# Patient Record
Sex: FEMALE | Race: WHITE | NOT HISPANIC OR LATINO | Employment: UNEMPLOYED | URBAN - METROPOLITAN AREA
[De-identification: names, ages, dates, MRNs, and addresses within clinical notes are randomized per-mention and may not be internally consistent; named-entity substitution may affect disease eponyms.]

---

## 2018-04-25 ENCOUNTER — CONVERSION ENCOUNTER (OUTPATIENT)
Dept: URGENT CARE | Facility: CLINIC | Age: 53
End: 2018-04-25

## 2018-04-25 ENCOUNTER — HOSPITAL ENCOUNTER (OUTPATIENT)
Dept: URGENT CARE | Facility: CLINIC | Age: 53
Discharge: HOME | End: 2018-04-25
Attending: FAMILY MEDICINE | Admitting: FAMILY MEDICINE

## 2018-06-20 ENCOUNTER — HOSPITAL ENCOUNTER (OUTPATIENT)
Dept: GENERAL RADIOLOGY | Facility: HOSPITAL | Age: 53
Discharge: HOME | End: 2018-06-20
Attending: NURSE PRACTITIONER | Admitting: NURSE PRACTITIONER

## 2018-06-21 ENCOUNTER — HOSPITAL ENCOUNTER (OUTPATIENT)
Dept: CARDIOLOGY | Facility: HOSPITAL | Age: 53
Discharge: HOME | End: 2018-06-21
Attending: NURSE PRACTITIONER | Admitting: NURSE PRACTITIONER

## 2019-06-04 VITALS
OXYGEN SATURATION: 98 % | HEART RATE: 75 BPM | HEIGHT: 63 IN | RESPIRATION RATE: 20 BRPM | DIASTOLIC BLOOD PRESSURE: 78 MMHG | SYSTOLIC BLOOD PRESSURE: 129 MMHG | BODY MASS INDEX: 28.49 KG/M2 | WEIGHT: 160.8 LBS

## 2019-09-26 ENCOUNTER — HOSPITAL ENCOUNTER (EMERGENCY)
Facility: HOSPITAL | Age: 54
Discharge: LEFT BEFORE DISCHARGE | End: 2019-09-26

## 2019-09-26 VITALS
OXYGEN SATURATION: 97 % | HEIGHT: 63 IN | BODY MASS INDEX: 28.98 KG/M2 | HEART RATE: 82 BPM | RESPIRATION RATE: 16 BRPM | WEIGHT: 163.58 LBS | TEMPERATURE: 97.9 F | SYSTOLIC BLOOD PRESSURE: 150 MMHG | DIASTOLIC BLOOD PRESSURE: 82 MMHG

## 2022-11-18 ENCOUNTER — TRANSCRIBE ORDERS (OUTPATIENT)
Dept: ADMINISTRATIVE | Facility: HOSPITAL | Age: 57
End: 2022-11-18

## 2022-11-18 DIAGNOSIS — I82.413 DVT OF DEEP FEMORAL VEIN, BILATERAL: Primary | ICD-10-CM

## 2023-01-29 ENCOUNTER — HOSPITAL ENCOUNTER (OUTPATIENT)
Facility: HOSPITAL | Age: 58
Discharge: HOME | End: 2023-01-29
Attending: EMERGENCY MEDICINE | Admitting: EMERGENCY MEDICINE
Payer: MEDICARE

## 2023-01-29 VITALS
OXYGEN SATURATION: 95 % | HEART RATE: 105 BPM | WEIGHT: 170 LBS | HEIGHT: 63 IN | TEMPERATURE: 98.9 F | BODY MASS INDEX: 30.12 KG/M2 | RESPIRATION RATE: 16 BRPM | DIASTOLIC BLOOD PRESSURE: 68 MMHG | SYSTOLIC BLOOD PRESSURE: 157 MMHG

## 2023-01-29 DIAGNOSIS — H66.002 NON-RECURRENT ACUTE SUPPURATIVE OTITIS MEDIA OF LEFT EAR WITHOUT SPONTANEOUS RUPTURE OF TYMPANIC MEMBRANE: Primary | ICD-10-CM

## 2023-01-29 PROCEDURE — G0463 HOSPITAL OUTPT CLINIC VISIT: HCPCS | Performed by: EMERGENCY MEDICINE

## 2023-01-29 PROCEDURE — 99213 OFFICE O/P EST LOW 20 MIN: CPT | Performed by: EMERGENCY MEDICINE

## 2023-01-29 RX ORDER — AZITHROMYCIN 250 MG/1
500 TABLET, FILM COATED ORAL ONCE
Status: COMPLETED | OUTPATIENT
Start: 2023-01-29 | End: 2023-01-29

## 2023-01-29 RX ORDER — CETIRIZINE HYDROCHLORIDE 10 MG/1
10 TABLET ORAL DAILY
Qty: 14 TABLET | Refills: 0 | Status: SHIPPED | OUTPATIENT
Start: 2023-01-29

## 2023-01-29 RX ORDER — AZITHROMYCIN 250 MG/1
TABLET, FILM COATED ORAL
Qty: 6 TABLET | Refills: 0 | Status: SHIPPED | OUTPATIENT
Start: 2023-01-29

## 2023-01-29 RX ADMIN — AZITHROMYCIN MONOHYDRATE 500 MG: 250 TABLET ORAL at 19:45

## 2023-01-30 NOTE — FSED PROVIDER NOTE
Subjective   History of Present Illness  Patient 57-year-old woman who presents complaining of nasal rhinorrhea with congestion and bilateral ear pain with left greater than right.  Symptoms all began approximately 6 days ago.  No associated fevers.  Patient also denies any throat pain.  Patient does have occasional cough but no shortness of breath or chest pain.  No vomiting or diarrhea no difficulty voiding.        Review of Systems   Constitutional: Negative for chills, fatigue and fever.   HENT: Positive for ear pain, rhinorrhea and sinus pressure. Negative for ear discharge, sore throat and trouble swallowing.    Respiratory: Positive for cough. Negative for shortness of breath.    Gastrointestinal: Negative for abdominal pain, diarrhea and vomiting.   Genitourinary: Negative for difficulty urinating.   Musculoskeletal: Positive for myalgias. Negative for back pain.   Neurological: Positive for headaches (Sinus congestion).       Past Medical History:   Diagnosis Date   • Asthma        Allergies   Allergen Reactions   • Influenza Virus Vaccine Swelling   • Latex Anaphylaxis   • Levaquin [Levofloxacin] Arrhythmia   • Paroxetine Itching   • Paxil [Paroxetine Hcl] Anaphylaxis   • Propoxyphene Hives       Past Surgical History:   Procedure Laterality Date   • CHOLECYSTECTOMY     • ECTOPIC PREGNANCY SURGERY     • TUBAL ABDOMINAL LIGATION         History reviewed. No pertinent family history.    Social History     Socioeconomic History   • Marital status:    Tobacco Use   • Smoking status: Every Day     Packs/day: 0.50     Types: Cigarettes   • Smokeless tobacco: Never   Vaping Use   • Vaping Use: Never used           Objective   Physical Exam  Vitals and nursing note reviewed.   Constitutional:       General: She is not in acute distress.     Appearance: Normal appearance. She is not ill-appearing or toxic-appearing.   HENT:      Head: Normocephalic and atraumatic.      Right Ear: Tympanic membrane, ear  canal and external ear normal.      Left Ear: Ear canal and external ear normal.      Ears:      Comments: Left ear examination with erythematous tympanic membrane.  No tragal tenderness.     Nose: Rhinorrhea present.      Mouth/Throat:      Mouth: Mucous membranes are moist.      Pharynx: Oropharynx is clear. No posterior oropharyngeal erythema.   Eyes:      Extraocular Movements: Extraocular movements intact.      Conjunctiva/sclera: Conjunctivae normal.      Pupils: Pupils are equal, round, and reactive to light.   Cardiovascular:      Rate and Rhythm: Normal rate and regular rhythm.      Pulses: Normal pulses.      Heart sounds: Normal heart sounds.   Pulmonary:      Effort: Pulmonary effort is normal.      Breath sounds: Normal breath sounds.   Abdominal:      General: Bowel sounds are normal.      Palpations: Abdomen is soft.      Tenderness: There is no abdominal tenderness.   Musculoskeletal:         General: No tenderness. Normal range of motion.      Cervical back: Normal range of motion and neck supple.      Right lower leg: No edema.      Left lower leg: No edema.   Skin:     General: Skin is warm and dry.      Capillary Refill: Capillary refill takes less than 2 seconds.   Neurological:      General: No focal deficit present.      Mental Status: She is alert.      Sensory: No sensory deficit.      Motor: No weakness.         Procedures           ED Course                                           MDM  Patient is a 57-year-old woman who presents complaining of nasal rhinorrhea and sinus congestion with bilateral ear pain with left greater than the right.  Symptoms have been ongoing for approximately 6 days.  No fevers or shortness of breath or chest pain or abdominal pain or vomiting or diarrhea or dysuria.  Patient states that she has had similar symptoms in the past and was treated with azithromycin which helped.  Patient also concerned that she may be having allergies as her  brought home 3  cats recently.  Suspect the patient may be having nasal rhinorrhea which has led to bilateral ear effusion with otitis media to the left ear.  Patient will be placed on azithromycin and Zyrtec.  Patient will return if symptoms worsen or any concerns.    Final diagnoses:   Non-recurrent acute suppurative otitis media of left ear without spontaneous rupture of tympanic membrane       ED Disposition  ED Disposition     ED Disposition   Discharge    Condition   Stable    Comment   --             Abelardo Felipe MD  [address] [place] [address] [place] [address]  [phone]    In 1 week      [facility]  [address] [facility] [address] [phone]    If symptoms worsen         Medication List      New Prescriptions    azithromycin 250 MG tablet  Commonly known as: ZITHROMAX  Take 2 tabs on day one, then 1 tablet once a day on days 2-5.     cetirizine 10 MG tablet  Commonly known as: zyrTEC  Take 1 tablet by mouth Daily.           Where to Get Your Medications      These medications were sent to [facility]/pharmacy #[id number] - [place], [place] - [address] [place] [address] - [phone]  - [phone] [address] [place] [address]    Hours: 24-hours Phone: [phone]   · azithromycin 250 MG tablet  · cetirizine 10 MG tablet

## 2023-01-30 NOTE — DISCHARGE INSTRUCTIONS
Please take Azithromycin as prescribed, starting tomorrow since you received your first dose tonight. Seek immediate medical attention if having any concerns.

## 2024-07-16 ENCOUNTER — HOSPITAL ENCOUNTER (EMERGENCY)
Facility: HOSPITAL | Age: 59
Discharge: HOME | End: 2024-07-16
Attending: EMERGENCY MEDICINE
Payer: MEDICARE

## 2024-07-16 VITALS
TEMPERATURE: 98.3 F | HEIGHT: 63 IN | BODY MASS INDEX: 29.95 KG/M2 | RESPIRATION RATE: 18 BRPM | SYSTOLIC BLOOD PRESSURE: 135 MMHG | WEIGHT: 169 LBS | OXYGEN SATURATION: 96 % | HEART RATE: 65 BPM | DIASTOLIC BLOOD PRESSURE: 76 MMHG

## 2024-07-16 DIAGNOSIS — K02.9 DENTAL CARIES: ICD-10-CM

## 2024-07-16 DIAGNOSIS — K08.89 PAIN, DENTAL: Primary | ICD-10-CM

## 2024-07-16 PROCEDURE — 99283 EMERGENCY DEPT VISIT LOW MDM: CPT | Performed by: NURSE PRACTITIONER

## 2024-07-16 PROCEDURE — 99283 EMERGENCY DEPT VISIT LOW MDM: CPT

## 2024-07-16 RX ORDER — AMOXICILLIN 500 MG/1
500 CAPSULE ORAL 3 TIMES DAILY
Qty: 30 CAPSULE | Refills: 0 | Status: SHIPPED | OUTPATIENT
Start: 2024-07-16 | End: 2024-07-26

## 2024-07-16 RX ORDER — CHLORHEXIDINE GLUCONATE ORAL RINSE 1.2 MG/ML
15 SOLUTION DENTAL 2 TIMES DAILY
Qty: 900 ML | Refills: 0 | Status: SHIPPED | OUTPATIENT
Start: 2024-07-16 | End: 2024-08-15

## 2024-07-16 RX ADMIN — DIPHENHYDRAMINE HCL ORAL: 25 SOLUTION ORAL at 22:21

## 2024-07-17 NOTE — FSED PROVIDER NOTE
Subjective   History of Present Illness  The patient is a 58-year-old female who presents to the ER with dental pain.  Patient reports that she has multiple teeth that need to be pulled.  Patient is complaining of right frontal lower dental pain for the past 2 days.  Patient has multiple areas of dental decay.  Patient reports she has called multiple dental offices today without success.     History provided by:  Patient   used: No        Review of Systems   HENT:  Positive for dental problem.        Past Medical History:   Diagnosis Date    Asthma        Allergies   Allergen Reactions    Influenza Virus Vaccine Swelling    Latex Anaphylaxis    Levaquin [Levofloxacin] Arrhythmia    Paroxetine Itching    Paxil [Paroxetine Hcl] Anaphylaxis    Propoxyphene Hives       Past Surgical History:   Procedure Laterality Date    CHOLECYSTECTOMY      ECTOPIC PREGNANCY SURGERY      TUBAL ABDOMINAL LIGATION         History reviewed. No pertinent family history.    Social History     Socioeconomic History    Marital status:    Tobacco Use    Smoking status: Every Day     Current packs/day: 0.50     Types: Cigarettes    Smokeless tobacco: Never   Vaping Use    Vaping status: Never Used           Objective   Physical Exam  Vitals and nursing note reviewed.   Constitutional:       Appearance: Normal appearance.   HENT:      Head: Normocephalic.      Mouth/Throat:      Dentition: Dental tenderness and dental caries present. No dental abscesses.      Comments: Patient with no trismus, no oral airway edema, no difficulty swallowing, no eccymosis gingival line, no facial swelling, No palpable asbcess noted,     Patient with multiple teeth that has dental caries. Has several teeth that is broken off. Does have some redness noted to the lower front teeth.   Musculoskeletal:         General: Normal range of motion.   Skin:     General: Skin is warm and dry.   Neurological:      General: No focal deficit present.       Mental Status: She is alert and oriented to person, place, and time.   Psychiatric:         Mood and Affect: Mood normal.         Behavior: Behavior normal. Behavior is cooperative.         Procedures           ED Course                                           Medical Decision Making  The patient is a 58-year-old female who presents to the ER with dental pain.  Patient reports that she has multiple teeth that need to be pulled.  Patient is complaining of right frontal lower dental pain for the past 2 days.  Patient has multiple areas of dental decay.  Patient reports she has called multiple dental offices today without success.     Patient with no trismus, no oral airway edema, no difficulty swallowing, no eccymosis gingival line, no facial swelling, No palpable asbcess noted,   Patient with multiple teeth that has dental caries. Has several teeth that is broken off. Does have some redness noted to the lower front teeth.     Patient presents for dental pain due to suspected dental felicita. Patient not immunosuppressed, afebrile and well appearing with patent airway, have low suspicfion for deep space infection or any concern for airway compromise. Based on history, physical, and work up. No evidence of avulsion, or bleeding socket. No evidence of RPA, PTA, Vinny's angina, periapical abscess.Instructed patient to continue to treat pain with ibuprofen/acetaminophen until they see a dentist.  Patient discharged home and will follow up with dentist. Discussed return precautions for odontogenic infections and other dental pain emergencies. Will provide dental clinic list.             Risk  Prescription drug management.        Final diagnoses:   Pain, dental   Dental caries       ED Disposition  ED Disposition       ED Disposition   Discharge    Condition   Stable    Comment   --               Abelardo Felipe MD  [address] [place] [address]  [phone]    Schedule an appointment as soon as possible  for a visit in 1 week  As needed, If symptoms worsen         Medication List        New Prescriptions      amoxicillin 500 MG capsule  Commonly known as: AMOXIL  Take 1 capsule by mouth 3 (Three) Times a Day for 10 days.     chlorhexidine 0.12 % solution  Commonly known as: PERIDEX  Apply 15 mL to the mouth or throat 2 (Two) Times a Day for 30 days. Swish around in your mouth and spit out.               Where to Get Your Medications        These medications were sent to [facility]/pharmacy #[id number] - [place], [place] - [address] [place] [address] [phone]  - [phone] [address] [place] [address]      Hours: 24-hours Phone: [phone]   amoxicillin 500 MG capsule  chlorhexidine 0.12 % solution

## 2024-07-17 NOTE — DISCHARGE INSTRUCTIONS
[facility] List    [facility]  [address] [facility]  [phone] Based on Income (Monday-Friday)  Appointment ONLY 8am-1pm  $15 minimum   [facility]  [address]  [phone] Based on Income (Monday-Friday)  [facility]  $12-24 minimum   [facility]  [address] [facility]   [phone] By appointment ONLY  Must call by 8:30 am to obtain an appointment for the next day  $50 minimum   [facility]  [address] [facility]  [phone] Must be homeless to be seen   [facility]  [address] [facility]  [phone] Walk in and appointments  7 days a week 9am-9pm  $83 minimum   [facility]  [address]  [phone] Walk in and appointments  9am-4pm  $50 minimum  Passport and other insurances accepted   [facility]  [address] [facility]  [phone] Walk in and appointments  9am-4pm  $50 minimum  Passport and other insurances accepted   [address] [place] [address] Dental  [address]  [phone] Walk in and appointments  9am-4pm  $50 minimum  Passport and other insurances accepted   [facility]  [phone] Walk in and appointments  9am-4pm  $50 minimum  Passport and other insurances accepted   [address]  [phone] Walk in and appointments  9am-4pm  $50 minimum  Passport and other insurances accepted       List listing is provided only as an informal guide and is not guaranteed to be complete or correct.  Please may your own inquiries and confirm the terms of care prior to setting an appointment.      Antinbiotics as prescribed, mouth rinse as prescribed,   Dental balls as needed for pain.     Call for follow up appointment.     Return for new or worsening problems

## 2024-08-05 ENCOUNTER — HOSPITAL ENCOUNTER (OUTPATIENT)
Facility: HOSPITAL | Age: 59
Discharge: HOME | End: 2024-08-05
Attending: EMERGENCY MEDICINE | Admitting: EMERGENCY MEDICINE
Payer: MEDICARE

## 2024-08-05 VITALS
TEMPERATURE: 98.2 F | SYSTOLIC BLOOD PRESSURE: 132 MMHG | WEIGHT: 169.09 LBS | OXYGEN SATURATION: 96 % | HEIGHT: 63 IN | BODY MASS INDEX: 29.96 KG/M2 | RESPIRATION RATE: 16 BRPM | HEART RATE: 96 BPM | DIASTOLIC BLOOD PRESSURE: 79 MMHG

## 2024-08-05 DIAGNOSIS — L30.4 INTERTRIGO: Primary | ICD-10-CM

## 2024-08-05 PROCEDURE — 99212 OFFICE O/P EST SF 10 MIN: CPT

## 2024-08-05 PROCEDURE — G0463 HOSPITAL OUTPT CLINIC VISIT: HCPCS

## 2024-08-05 RX ORDER — NYSTATIN 100000 [USP'U]/G
POWDER TOPICAL 3 TIMES DAILY
Qty: 15 G | Refills: 0 | Status: SHIPPED | OUTPATIENT
Start: 2024-08-05 | End: 2024-08-12

## 2024-08-06 NOTE — DISCHARGE INSTRUCTIONS
Take Tylenol or ibuprofen as needed for pain    Recommend that you apply nystatin powder to the right lower skin fold skin rash.    Follow-up with your family doctor within the next week for recheck    Turn to ER for worsening symptom

## 2024-08-06 NOTE — FSED PROVIDER NOTE
Subjective   History of Present Illness  58-year-old female reports an area of reddened rash to the right side of her lower abdomen underneath her skin fold.  She reports that she cannot see the rash and she has been putting Terry's baby powder and triple antibiotic ointment but is not getting any better.  She reports that the area is tender.  She denies that she has a similar rash presentation anywhere else on her body.        Review of Systems   All other systems reviewed and are negative.      Past Medical History:   Diagnosis Date    Asthma        Allergies   Allergen Reactions    Influenza Virus Vaccine Swelling    Latex Anaphylaxis    Levaquin [Levofloxacin] Arrhythmia    Paroxetine Itching    Paxil [Paroxetine Hcl] Anaphylaxis    Propoxyphene Hives       Past Surgical History:   Procedure Laterality Date    CHOLECYSTECTOMY      ECTOPIC PREGNANCY SURGERY      TUBAL ABDOMINAL LIGATION         History reviewed. No pertinent family history.    Social History     Socioeconomic History    Marital status:    Tobacco Use    Smoking status: Every Day     Current packs/day: 0.50     Types: Cigarettes    Smokeless tobacco: Never   Vaping Use    Vaping status: Never Used           Objective   Physical Exam  Vitals and nursing note reviewed.   Constitutional:       Appearance: Normal appearance.   HENT:      Head: Normocephalic and atraumatic.      Mouth/Throat:      Mouth: Mucous membranes are moist.      Pharynx: Oropharynx is clear.   Eyes:      Extraocular Movements: Extraocular movements intact.      Pupils: Pupils are equal, round, and reactive to light.   Cardiovascular:      Rate and Rhythm: Normal rate.      Pulses: Normal pulses.   Pulmonary:      Effort: Pulmonary effort is normal.      Breath sounds: Normal breath sounds.   Abdominal:      Palpations: Abdomen is soft.   Musculoskeletal:         General: Normal range of motion.      Cervical back: Normal range of motion.   Skin:     Comments:  Patient's right lower abdomen underneath her abdominal skin fold there is a 3 x 4.5 cm circular area of bright red in tissue without vesicular or blistering noted.  Presentation appears consistent with Candida of skin fold or intertrigo   Neurological:      General: No focal deficit present.      Mental Status: She is alert and oriented to person, place, and time.         Procedures           ED Course                                           Medical Decision Making  Problems Addressed:  Intertrigo: complicated acute illness or injury    Risk  Prescription drug management.        Final diagnoses:   Intertrigo       ED Disposition  ED Disposition       ED Disposition   Discharge    Condition   Stable    Comment   --               No follow-up provider specified.       Medication List        New Prescriptions      nystatin 639105 UNIT/GM powder  Commonly known as: MYCOSTATIN  Apply  topically to the appropriate area as directed 3 (Three) Times a Day for 7 days.               Where to Get Your Medications        These medications were sent to [facility]/pharmacy #[id number] - [place], [place] - [address] [place] [address] - [phone]  - [phone] [address] [place] [address]      Hours: 24-hours Phone: [phone]   nystatin 630673 UNIT/GM powder

## 2024-08-10 ENCOUNTER — HOSPITAL ENCOUNTER (OUTPATIENT)
Facility: HOSPITAL | Age: 59
Discharge: HOME | End: 2024-08-10
Attending: EMERGENCY MEDICINE | Admitting: EMERGENCY MEDICINE
Payer: MEDICARE

## 2024-08-10 VITALS
HEIGHT: 63 IN | DIASTOLIC BLOOD PRESSURE: 50 MMHG | HEART RATE: 90 BPM | OXYGEN SATURATION: 98 % | RESPIRATION RATE: 18 BRPM | TEMPERATURE: 97.9 F | WEIGHT: 171 LBS | SYSTOLIC BLOOD PRESSURE: 129 MMHG | BODY MASS INDEX: 30.3 KG/M2

## 2024-08-10 DIAGNOSIS — R60.9 EDEMA, UNSPECIFIED TYPE: Primary | ICD-10-CM

## 2024-08-10 PROCEDURE — G0463 HOSPITAL OUTPT CLINIC VISIT: HCPCS | Performed by: NURSE PRACTITIONER

## 2024-08-10 PROCEDURE — 99213 OFFICE O/P EST LOW 20 MIN: CPT | Performed by: NURSE PRACTITIONER

## 2024-08-10 RX ORDER — HYDROCHLOROTHIAZIDE 12.5 MG/1
12.5 TABLET ORAL DAILY
Qty: 14 TABLET | Refills: 0 | Status: SHIPPED | OUTPATIENT
Start: 2024-08-10 | End: 2024-08-24

## 2024-08-10 NOTE — ED NOTES
Pt comes in today complaining of BLE swelling. States she has been out of her water pill for about 3-4 months. Pt is wearing compression her own compression stockings.

## 2024-08-10 NOTE — DISCHARGE INSTRUCTIONS
You need to rest with your legs elevated.  Take your medication as directed.  Call your family doctor and get an appointment to be seen.  Return to the emergency department for worsening symptoms such as shortness of breath, cough, coughing up blood vomiting up blood or having bloody diarrhea or development of fever

## 2024-08-10 NOTE — FSED PROVIDER NOTE
"Subjective   History of Present Illness  58-year-old female complains of bilateral leg swelling.  She states she \"has not had her meds in a while\".  She is supposed to be taking \"a water pill\".  She thinks it is 12.5 mg.  I asked her if it was hydrochlorothiazide and she said yes.  She does see Dr. Abelardo Stewart.  Her last appointment was approximately 5 months ago.  She reports she called the pharmacy and they told her that she had to have an appointment to get her medication refilled.  She states she has been swollen for the past 2 to 3 days it is worse yesterday.  She states she was seen here and diagnosed with an infection in her abdomen and she is on antibiotics.  Her boyfriend has been admitted to the hospital so she is \"doing everything\".  She denies shortness of breath denies chest pain denies cough.  I have advised her to schedule and keep her appointment with her family doctor.    History provided by:  Patient   used: No        Review of Systems   Respiratory:  Negative for cough and shortness of breath.    Cardiovascular:  Positive for leg swelling. Negative for chest pain.        Patient has minimal leg swelling bilaterally.   All other systems reviewed and are negative.      Past Medical History:   Diagnosis Date    Asthma        Allergies   Allergen Reactions    Influenza Virus Vaccine Swelling    Latex Anaphylaxis    Levaquin [Levofloxacin] Arrhythmia    Paroxetine Itching    Paxil [Paroxetine Hcl] Anaphylaxis    Propoxyphene Hives       Past Surgical History:   Procedure Laterality Date    CHOLECYSTECTOMY      ECTOPIC PREGNANCY SURGERY      TUBAL ABDOMINAL LIGATION         History reviewed. No pertinent family history.    Social History     Socioeconomic History    Marital status:    Tobacco Use    Smoking status: Every Day     Current packs/day: 0.50     Types: Cigarettes    Smokeless tobacco: Never   Vaping Use    Vaping status: Never Used   Substance and Sexual Activity "    Alcohol use: Never    Drug use: Never           Objective   Physical Exam  Vitals and nursing note reviewed.   Constitutional:       Appearance: Normal appearance. She is normal weight.   HENT:      Head: Normocephalic and atraumatic.   Cardiovascular:      Rate and Rhythm: Normal rate and regular rhythm.      Pulses: Normal pulses.      Heart sounds: Normal heart sounds.   Pulmonary:      Effort: Pulmonary effort is normal.      Breath sounds: Normal breath sounds.   Musculoskeletal:         General: Normal range of motion.      Comments: Patient has minimal bilateral lower extremity edema.  She is wearing compression stockings at present.  I have advised her to rest and elevate her legs and eat a healthier diet.  She states she has been eating a lot of fast food running around between the hospital and home.   Neurological:      Mental Status: She is alert and oriented to person, place, and time.   Psychiatric:         Mood and Affect: Mood normal.         Behavior: Behavior normal.         Procedures           ED Course                                           Medical Decision Making  58-year-old female who has been out of her diuretic presents with swollen lower extremities.  She attempted to schedule an appointment with her family doctor but he cannot see her for couple of weeks.  I have advised her to call him  on Monday and schedule an appointment.  I will refill her medications for 2 weeks.  I have advised her to return to the emergency department for worsening symptoms.  Patient verbalized understanding, her differential diagnoses include edema, medication noncompliance, shortness of breath.  This does not constitute all considered diagnoses.    Problems Addressed:  Edema, unspecified type: complicated acute illness or injury    Risk  Prescription drug management.        Final diagnoses:   Edema, unspecified type       ED Disposition  ED Disposition       ED Disposition   Discharge    Condition   Stable     Comment   --               Abelardo Felipe MD  [address] [place] [address] [place] [address] [phone]    Schedule an appointment as soon as possible for a visit in 1 week  As needed, If symptoms worsen, call his office on Monday and schedule an appointment to be seen.         Medication List        New Prescriptions      hydroCHLOROthiazide 12.5 MG tablet  Take 1 tablet by mouth Daily for 14 days.               Where to Get Your Medications        These medications were sent to [facility]/pharmacy #[id number] - [place], [place] - [address] [place] [address] [phone]  - [phone] [address] [place] [address]      Hours: 24-hours Phone: [phone]   hydroCHLOROthiazide 12.5 MG tablet

## 2024-11-10 ENCOUNTER — APPOINTMENT (OUTPATIENT)
Dept: GENERAL RADIOLOGY | Facility: HOSPITAL | Age: 59
End: 2024-11-10
Payer: MEDICARE

## 2024-11-10 ENCOUNTER — HOSPITAL ENCOUNTER (OUTPATIENT)
Facility: HOSPITAL | Age: 59
Discharge: HOME | End: 2024-11-10
Attending: EMERGENCY MEDICINE | Admitting: EMERGENCY MEDICINE
Payer: MEDICARE

## 2024-11-10 VITALS
TEMPERATURE: 97.6 F | WEIGHT: 172 LBS | DIASTOLIC BLOOD PRESSURE: 67 MMHG | HEART RATE: 91 BPM | SYSTOLIC BLOOD PRESSURE: 146 MMHG | RESPIRATION RATE: 15 BRPM | HEIGHT: 63 IN | BODY MASS INDEX: 30.48 KG/M2 | OXYGEN SATURATION: 97 %

## 2024-11-10 DIAGNOSIS — R05.1 ACUTE COUGH: Primary | ICD-10-CM

## 2024-11-10 LAB
FLUAV SUBTYP SPEC NAA+PROBE: NOT DETECTED
FLUBV RNA ISLT QL NAA+PROBE: NOT DETECTED
SARS-COV-2 RNA RESP QL NAA+PROBE: NOT DETECTED

## 2024-11-10 PROCEDURE — 71046 X-RAY EXAM CHEST 2 VIEWS: CPT

## 2024-11-10 PROCEDURE — G0463 HOSPITAL OUTPT CLINIC VISIT: HCPCS

## 2024-11-10 PROCEDURE — 87636 SARSCOV2 & INF A&B AMP PRB: CPT | Performed by: EMERGENCY MEDICINE

## 2024-11-10 RX ORDER — BENZONATATE 100 MG/1
100 CAPSULE ORAL 3 TIMES DAILY PRN
Qty: 21 CAPSULE | Refills: 0 | Status: SHIPPED | OUTPATIENT
Start: 2024-11-10 | End: 2024-11-17

## 2024-11-10 RX ORDER — PREDNISONE 20 MG/1
40 TABLET ORAL DAILY
Qty: 10 TABLET | Refills: 0 | Status: SHIPPED | OUTPATIENT
Start: 2024-11-10 | End: 2024-11-15

## 2024-11-10 RX ORDER — AZITHROMYCIN 250 MG/1
TABLET, FILM COATED ORAL
Qty: 6 TABLET | Refills: 0 | Status: SHIPPED | OUTPATIENT
Start: 2024-11-10

## 2024-11-11 NOTE — DISCHARGE INSTRUCTIONS
Increase hydration    Continue to treat with Tylenol and ibuprofen as needed for pain or fever.    Follow-up with primary care in 3 to 5 days if symptoms persist.    Return to the ER with any new or worsening symptoms.

## 2024-11-11 NOTE — FS[facility] PROVIDER NOTE
Subjective   History of Present Illness  Patient is a 59-year-old female with history of asthma reports a cough for 3 days.  She reports her  has pneumonia.  She denies fever, nausea, vomiting, diarrhea, chest pain, shortness of air.        Review of Systems   Respiratory:  Positive for cough.    All other systems reviewed and are negative.      Past Medical History:   Diagnosis Date    Asthma        Allergies   Allergen Reactions    Influenza Virus Vaccine Swelling    Latex Anaphylaxis    Levaquin [Levofloxacin] Arrhythmia    Paroxetine Itching    Paxil [Paroxetine Hcl] Anaphylaxis    Propoxyphene Hives       Past Surgical History:   Procedure Laterality Date    CHOLECYSTECTOMY      ECTOPIC PREGNANCY SURGERY      TUBAL ABDOMINAL LIGATION         History reviewed. No pertinent family history.    Social History     Socioeconomic History    Marital status:    Tobacco Use    Smoking status: Every Day     Current packs/day: 0.50     Types: Cigarettes    Smokeless tobacco: Never   Vaping Use    Vaping status: Never Used   Substance and Sexual Activity    Alcohol use: Never    Drug use: Never           Objective   Physical Exam  Vitals and nursing note reviewed.   Constitutional:       General: She is not in acute distress.     Appearance: Normal appearance. She is not ill-appearing, toxic-appearing or diaphoretic.   HENT:      Head: Normocephalic.      Right Ear: Tympanic membrane, ear canal and external ear normal. There is no impacted cerumen.      Left Ear: Tympanic membrane, ear canal and external ear normal. There is no impacted cerumen.      Nose: No congestion or rhinorrhea.      Mouth/Throat:      Mouth: Mucous membranes are moist.      Pharynx: No oropharyngeal exudate or posterior oropharyngeal erythema.   Eyes:      Pupils: Pupils are equal, round, and reactive to light.   Cardiovascular:      Rate and Rhythm: Normal rate and regular rhythm.      Pulses: Normal pulses.      Heart sounds: Normal  heart sounds.   Pulmonary:      Effort: Pulmonary effort is normal. No respiratory distress.      Breath sounds: Normal breath sounds. No stridor. No wheezing, rhonchi or rales.   Chest:      Chest wall: No tenderness.   Abdominal:      General: Abdomen is flat. There is no distension.      Palpations: Abdomen is soft. There is no mass.      Tenderness: There is no abdominal tenderness. There is no right CVA tenderness, left CVA tenderness, guarding or rebound.      Hernia: No hernia is present.   Musculoskeletal:         General: Normal range of motion.      Cervical back: Normal range of motion.   Skin:     General: Skin is warm.      Capillary Refill: Capillary refill takes less than 2 seconds.   Neurological:      General: No focal deficit present.      Mental Status: She is alert.   Psychiatric:         Mood and Affect: Mood normal.         Procedures           ED Course  ED Course as of 11/10/24 2045   Sun Nov 10, 2024   1940 COVID19: Not Detected [CW]   1940 Influenza A PCR: Not Detected [CW]   1940 Influenza B PCR: Not Detected [CW]   2006 XR CHEST PA AND LATERAL     Date of Exam: 11/10/2024 7:25 PM EST     Indication: congestion     Comparison: 11/12/2018.     Findings:  There is no pneumothorax, pleural effusion or focal airspace consolidation. Heart size and pulmonary vasculature appear within normal limits. Regional bones appear intact.     IMPRESSION:  Impression:  No acute cardiopulmonary abnormality.   [CW]      ED Course User Index  [CW] Lorena Stewart, APRN                                           Medical Decision Making  Patient is a 59-year-old female with history of asthma reports a cough for 3 days.  She reports her  has pneumonia.  She denies fever, nausea, vomiting, diarrhea, chest pain, shortness of air.    My differential includes COVID-19, influenza, pneumonia    Upon exam patient is awake and alert, nontoxic-appearing, appears in no acute distress, and is answering questions  appropriately.  Lung sounds are clear and equal bilaterally.  Heart is normal rate and rhythm.  Mucous membranes are moist, oropharynx is free of erythema, exudate, lesions, uvula is midline, tonsils are 1+.  I was able to visualize bilateral TMs and they were normal.  A COVID and influenza PCR swab was ordered and was negative.  Two-view chest x-ray was ordered and was unremarkable.  I prescribed her azithromycin, and prednisone Tessalon Perles.  I have advised her to continue to use over-the-counter medication as needed for symptom management, to increase her hydration, and to follow-up with her primary care in 3 to 5 days if symptoms persist.  I have advised her to return to the ER with any new or worsening symptoms.        Problems Addressed:  Acute cough: complicated acute illness or injury    Amount and/or Complexity of Data Reviewed  Labs:  Decision-making details documented in ED Course.  Radiology: ordered.    Risk  Prescription drug management.        Final diagnoses:   Acute cough       ED Disposition  ED Disposition       ED Disposition   Discharge    Condition   Stable    Comment   --               Abelardo Felipe MD  [address] [place] [address] [place] [facility]  [phone]    In 1 week  As needed, If symptoms worsen         Medication List        New Prescriptions      benzonatate 100 MG capsule  Commonly known as: TESSALON  Take 1 capsule by mouth 3 (Three) Times a Day As Needed for Cough for up to 7 days.     predniSONE 20 MG tablet  Commonly known as: DELTASONE  Take 2 tablets by mouth Daily for 5 days.            Changed      azithromycin 250 MG tablet  Commonly known as: Zithromax Z-Wilber  Take 2 tablets by mouth on day 1, then 1 tablet daily on days 2-5  What changed: additional instructions               Where to Get Your Medications        These medications were sent to [facility]/pharmacy #[id number] - [place], [place] - [address] [facility] - [phone] [facility] [phone] [address],  [place] [address]      Hours: 24-hours Phone: [phone]   azithromycin 250 MG tablet  benzonatate 100 MG capsule  predniSONE 20 MG tablet

## 2024-12-11 ENCOUNTER — APPOINTMENT (OUTPATIENT)
Dept: GENERAL RADIOLOGY | Facility: HOSPITAL | Age: 59
End: 2024-12-11
Payer: MEDICAID

## 2024-12-11 ENCOUNTER — HOSPITAL ENCOUNTER (OUTPATIENT)
Facility: HOSPITAL | Age: 59
Discharge: HOME | End: 2024-12-11
Attending: EMERGENCY MEDICINE | Admitting: EMERGENCY MEDICINE
Payer: MEDICAID

## 2024-12-11 VITALS
BODY MASS INDEX: 31.54 KG/M2 | DIASTOLIC BLOOD PRESSURE: 66 MMHG | RESPIRATION RATE: 18 BRPM | SYSTOLIC BLOOD PRESSURE: 126 MMHG | HEART RATE: 90 BPM | HEIGHT: 63 IN | TEMPERATURE: 98 F | WEIGHT: 178 LBS | OXYGEN SATURATION: 96 %

## 2024-12-11 DIAGNOSIS — S80.11XA CONTUSION OF RIGHT LOWER LEG, INITIAL ENCOUNTER: ICD-10-CM

## 2024-12-11 DIAGNOSIS — L08.9 INFECTED ABRASION OF RIGHT FOREARM, INITIAL ENCOUNTER: ICD-10-CM

## 2024-12-11 DIAGNOSIS — S40.012A CONTUSION OF LEFT SHOULDER, INITIAL ENCOUNTER: Primary | ICD-10-CM

## 2024-12-11 DIAGNOSIS — S50.811A INFECTED ABRASION OF RIGHT FOREARM, INITIAL ENCOUNTER: ICD-10-CM

## 2024-12-11 DIAGNOSIS — S80.12XA CONTUSION OF LEFT LOWER LEG, INITIAL ENCOUNTER: ICD-10-CM

## 2024-12-11 PROCEDURE — 90471 IMMUNIZATION ADMIN: CPT | Performed by: EMERGENCY MEDICINE

## 2024-12-11 PROCEDURE — 90715 TDAP VACCINE 7 YRS/> IM: CPT | Performed by: EMERGENCY MEDICINE

## 2024-12-11 PROCEDURE — G0463 HOSPITAL OUTPT CLINIC VISIT: HCPCS | Performed by: EMERGENCY MEDICINE

## 2024-12-11 PROCEDURE — 73590 X-RAY EXAM OF LOWER LEG: CPT

## 2024-12-11 PROCEDURE — 73030 X-RAY EXAM OF SHOULDER: CPT

## 2024-12-11 PROCEDURE — 25010000002 TETANUS-DIPHTH-ACELL PERTUSSIS 5-2.5-18.5 LF-MCG/0.5 SUSPENSION PREFILLED SYRINGE: Performed by: EMERGENCY MEDICINE

## 2024-12-11 RX ORDER — CEPHALEXIN 500 MG/1
500 CAPSULE ORAL ONCE
Status: COMPLETED | OUTPATIENT
Start: 2024-12-11 | End: 2024-12-11

## 2024-12-11 RX ORDER — CEPHALEXIN 500 MG/1
500 CAPSULE ORAL 4 TIMES DAILY
Qty: 28 CAPSULE | Refills: 0 | Status: SHIPPED | OUTPATIENT
Start: 2024-12-11 | End: 2024-12-18

## 2024-12-11 RX ADMIN — TETANUS TOXOID, REDUCED DIPHTHERIA TOXOID AND ACELLULAR PERTUSSIS VACCINE, ADSORBED 0.5 ML: 5; 2.5; 8; 8; 2.5 SUSPENSION INTRAMUSCULAR at 20:10

## 2024-12-11 RX ADMIN — CEPHALEXIN 500 MG: 500 CAPSULE ORAL at 20:10

## 2024-12-12 NOTE — FSED PROVIDER NOTE
Subjective   History of Present Illness    Patient is 59-year-old woman who presents complaining of mild to moderate pains to the left shoulder and both lower legs.  Symptoms all began 1 week ago when she states her bathroom ceiling fell down on her.  She been having pain since the incident.  She has an abrasion and skin wound to the right forearm which she is concerned may be infected.  She has had no fevers or purulent drainage.  No loss of consciousness or vomiting.  Pains are worse with movement and better with immobilization.  No shortness of breath or abdominal pain.    Review of Systems    Past Medical History:   Diagnosis Date    Asthma        Allergies   Allergen Reactions    Influenza Virus Vaccine Swelling    Latex Anaphylaxis    Levaquin [Levofloxacin] Arrhythmia    Paroxetine Itching    Paxil [Paroxetine Hcl] Anaphylaxis    Propoxyphene Hives       Past Surgical History:   Procedure Laterality Date    CHOLECYSTECTOMY      ECTOPIC PREGNANCY SURGERY      TUBAL ABDOMINAL LIGATION         History reviewed. No pertinent family history.    Social History     Socioeconomic History    Marital status:    Tobacco Use    Smoking status: Every Day     Current packs/day: 0.50     Types: Cigarettes    Smokeless tobacco: Never   Vaping Use    Vaping status: Never Used   Substance and Sexual Activity    Alcohol use: Never    Drug use: Never           Objective   Physical Exam  Vitals and nursing note reviewed.   Constitutional:       General: She is not in acute distress.     Appearance: Normal appearance. She is not ill-appearing or toxic-appearing.   HENT:      Head: Normocephalic and atraumatic.      Nose: Nose normal.      Mouth/Throat:      Mouth: Mucous membranes are moist.   Eyes:      Extraocular Movements: Extraocular movements intact.   Cardiovascular:      Rate and Rhythm: Normal rate and regular rhythm.      Pulses: Normal pulses.      Heart sounds: Normal heart sounds.   Pulmonary:      Effort:  Pulmonary effort is normal.      Breath sounds: Normal breath sounds.   Chest:      Chest wall: No tenderness.   Abdominal:      Palpations: Abdomen is soft.      Tenderness: There is no abdominal tenderness.   Musculoskeletal:         General: Tenderness present. No swelling or deformity.      Cervical back: Normal range of motion and neck supple. No rigidity or tenderness.      Right lower leg: No edema.      Left lower leg: No edema.      Comments: Left shoulder examination with tenderness to the left posterior shoulder and superior portion of the shoulder.  There is full range of motion of passive movement without any evidence of laxity or crepitus.  No elbow or wrist pain.  Patient is neurovascular intact in left hand with 2+ radial pulse and cap refill in fingertips less than 2 seconds.  No clavicular pain.  Bilateral lower extremities from the knees down to the ankles with diffuse generalized tenderness without any gross deformity.  There is full range of motion at the knees and hips and ankles.  Patient is neurovascular intact in both feet.  2+ pedal pulse present.  Right dorsal proximal forearm with a 1 cm abrasion without surrounding erythema or purulent drainage.  There is minimal tenderness with palpation.  Patient is neuro vastly intact and there is no bony tenderness or joint tenderness.     Skin:     General: Skin is warm and dry.      Capillary Refill: Capillary refill takes less than 2 seconds.   Neurological:      General: No focal deficit present.      Mental Status: She is alert.         Procedures           ED Course                                           Medical Decision Making    Patient is a 59-year-old woman who had bathroom ceiling fall on her approximate 1 week ago.  She had injuries to the left shoulder and both lower extremities and abrasion to the right proximal forearm.  She is been having pain since the incident.  She had no loss of consciousness or vomiting.  No abdominal pain or  chest pain.  On examination she has diffuse tenderness to the lower extremities and no gross deformity and there is full range of motion of the joints.  She has posterior and superior tenderness to the left shoulder without any evidence of crepitus or laxity.  No elbow or wrist pain.  Patient is neuro vastly intact in all extremities.  There is an abrasion to the proximal dorsal right forearm which the patient is concern for possible cellulitis.  The patient will be started on Keflex.  Imaging of left shoulder, bilateral tib-fib.  Left shoulder imaging reveals no acute findings.  Bilateral tib-fib with soft tissue swelling but no  bony injuries.  Patient advised.    Final diagnoses:   Contusion of left shoulder, initial encounter   Contusion of left lower leg, initial encounter   Contusion of right lower leg, initial encounter   Infected abrasion of right forearm, initial encounter       ED Disposition  ED Disposition       ED Disposition   Discharge    Condition   Stable    Comment   --               Abelardo Felipe MD  [address] [place] [address] [place] [address] [phone]    In 1 week           Medication List        New Prescriptions      cephalexin 500 MG capsule  Commonly known as: KEFLEX  Take 1 capsule by mouth 4 (Four) Times a Day for 7 days.            Stop      azithromycin 250 MG tablet  Commonly known as: Zithromax Z-Wilber               Where to Get Your Medications        These medications were sent to [facility]/pharmacy #[id number] - [place], [place] - [address] [place] [address] - [phone]  - [phone] [address] [place] [address]      Hours: 24-hours Phone: [phone]   cephalexin 500 MG capsule

## 2024-12-12 NOTE — DISCHARGE INSTRUCTIONS
Take Keflex as prescribed for presumed infected abrasion.  Apply cold compress to sore areas.  Gentle range of motion exercises to help reduce spasms.  Please follow-up with your provider.  Seek immediate medical attention anytime if having any concerns.